# Patient Record
Sex: FEMALE | Race: BLACK OR AFRICAN AMERICAN
[De-identification: names, ages, dates, MRNs, and addresses within clinical notes are randomized per-mention and may not be internally consistent; named-entity substitution may affect disease eponyms.]

---

## 2020-10-05 NOTE — HP
Admitting History and Physical





- Primary Care Physician


PCP: Remington Wilson





- Admission


Chief Complaint: H/o right breast cancer


History of Present Illness: 


Patient is a 64 yo female with h/o right breast WE and SNBx in 2015 sec to 

cancer.  The cancer was triple negative and therefore patient received chemo and

radiation.  Patient is now presenting for removal of lifeport.


History Source: Patient


Limitations to Obtaining History: No Limitations





- Past Medical History


Heme/Onc: Yes: Cancer (right breast cancer (2015))





- Past Surgical History


Past Surgical History: Yes: Breast Biopsy (right breast WE with snbx 2015)


Additional Past Surgical History: 





port placement 


endometrial ablation (at 53)


left core bx (2017)





- Smoking History


Smoking history: Former smoker


Have you smoked in the past 12 months: No


If you are a former smoker, when did you quit?: 30 YEARS AGO





Home Medications





- Allergies


Allergies/Adverse Reactions: 


                                    Allergies











Allergy/AdvReac Type Severity Reaction Status Date / Time


 


vancomycin Allergy Severe Hives Verified 10/05/20 10:26


 


latex Allergy Intermediate Itching Verified 10/05/20 10:26














- Home Medications


Home Medications: 


Ambulatory Orders





NK [No Known Home Medication]  10/05/20 











Family Medical History


Family Hx Cancer: Grandmother (maternal) (ovarian cancer), Mother (breast cancer

at 75), Father (prostate cancer at 65)





Review of Systems





- Review of Systems


Constitutional: reports: No Symptoms


Cardiovascular: reports: No Symptoms


Respiratory: reports: No Symptoms





Physical Examination


Constitutional: Yes: Well Nourished, Calm


Breast(s): Yes: Other (A cup breasts with a well-healed right breast scar. No 

evidence of palpable masses or adenopathy noted bilaterally.)





Problem List





- Problems


(1) Breast cancer, right


Code(s): C50.911 - MALIGNANT NEOPLASM OF UNSP SITE OF RIGHT FEMALE BREAST   


Qualifiers: 


   Breast location: unspecified site of breast   Patient sex: female 





Assessment/Plan





Removal of lifeport

## 2020-10-08 ENCOUNTER — HOSPITAL ENCOUNTER (OUTPATIENT)
Dept: HOSPITAL 74 - FASU | Age: 65
Discharge: HOME | End: 2020-10-08
Attending: SURGERY
Payer: COMMERCIAL

## 2020-10-08 VITALS — HEART RATE: 77 BPM | DIASTOLIC BLOOD PRESSURE: 71 MMHG | SYSTOLIC BLOOD PRESSURE: 128 MMHG

## 2020-10-08 VITALS — BODY MASS INDEX: 23.7 KG/M2

## 2020-10-08 VITALS — TEMPERATURE: 98 F

## 2020-10-08 DIAGNOSIS — Z92.21: ICD-10-CM

## 2020-10-08 DIAGNOSIS — Z92.3: ICD-10-CM

## 2020-10-08 DIAGNOSIS — Z85.3: Primary | ICD-10-CM

## 2020-10-08 PROCEDURE — 05HN33Z INSERTION OF INFUSION DEVICE INTO LEFT INTERNAL JUGULAR VEIN, PERCUTANEOUS APPROACH: ICD-10-PCS | Performed by: SURGERY

## 2020-10-08 NOTE — OP
DATE OF OPERATION:  10/08/2020

 

PREOPERATIVE DIAGNOSIS:  History of right breast cancer.

 

POSTOPERATIVE DIAGNOSIS:  History of right breast cancer.

 

PROCEDURE:  Removal of left-sided internal jugular Port-A-Cath.

 

PRIMARY SURGEON:  Marco Wilson MD

 

FIRST ASSISTANT:  EDGAR Hanna

 

COMPLICATIONS:  There were no complications.

 

ANESTHESIA:  Local with IV sedation.

 

INDICATION:  Briefly the patient is a 65-year-old postmenopausal female of 

[place] descent who has a history of undergoing a right breast partial mastectomy in

2015 with sentinel lymph node biopsy for a 3-cm triple-negative breast cancer with

negative lymph nodes.  She underwent adjuvant chemotherapy through a left-sided

Port-A-Cath and had external beam radiation.  She has been doing fine and had some

recent biopsies showing benign papillomas in the left breast.  She comes in today for

removal of a left chest wall internal jugular vein Port-A-Cath which was placed back

in 2015.  The patient was brought in through Ambulatory Surgery on October 8, 2020. 

In the [facility] area site verification was made and informed consent was

obtained.

 

DESCRIPTION OF PROCEDURE:  She was brought into the operating room and laid on the OR

table in a supine position.  Venodynes were placed on the lower extremities prior to

local anesthesia.  She underwent IV sedation, and the left chest wall was sterilely

prepped and draped over the Port-A-Cath site.  Lidocaine 1% was given directly over

the Port-A-Cath site, and an elliptical incision was made around the previous

insertion site, and the scar was removed.  The catheter was completely removed intact

along with the capsule and sent to Pathology as specimen.  Hemostasis was achieved

using electrocautery.  The subcutaneous tissue was then reapproximated using 2-0

plain suture.  The skin was closed using interrupted 3-0 deep dermal Vicryl suture

and a running 4-0 subcuticular Biosyn suture.  Mastisol, Steri-Strips were applied

over the wound with a compressive dressing placed over this.  The patient tolerated

the procedure well without difficulty, was awake and alert at the end of the

procedure and will be brought back to Ambulatory Surgery, where she will be

discharged home the same day once discharge criteria are met.  Estimated blood loss

was minimal, and all sponge and needle counts were correct at the end of the case. 

The patient should follow up in the office in 1 week for a formal wound evaluation.

 

 

MARCO WILSON M.D.

 

COURTNEY/[id number]

DD: 10/08/2020 11:52

DT: 10/08/2020 12:26

Job #:  [id number]

## 2020-10-08 NOTE — XMS
Summarization Of Episode

                           Created on:2020



Patient:PRATIMA CAUSEY

Sex:Female

:1955

External Reference #:[id number]





Demographics







                          Address                   [address]



                                                    [place] [address]

 

                          Home Phone                [phone]

 

                          Work Phone                [phone]

 

                          Email Address             [email or web address]

 

                          Preferred Language        English

 

                          Marital Status            Not  or 

 

                          Sabianism Affiliation     BA

 

                          Race                      BL

 

                          Ethnic Group              Not  or 









Author







                          Organization              [facility]









Support







                Name            Relationship    Address         Phone

 

                [facility] Unavailable     [address]   [phone]



                                                [place] [address] 

 

                LARRY LANGLEY          [address]  [phone]



                                                [place] [address] 









Care Team Providers







                    Name                Role                Phone

 

                    JORGE LAKE     Unavailable         Unavailable

 

                    ACIERNO             Unavailable         Unavailable

 

                    Acierno             Unavailable         [phone]

 

                    MD [place]    Unavailable         Unavailable

 

                    MD [place]    Unavailable         Unavailable

 

                    MD [place]    Unavailable         Unavailable

 

                    MD [place]    Unavailable         Unavailable

 

                    MD [place]    Unavailable         Unavailable

 

                    MD [place]    Unavailable         Unavailable

 

                    MD [place]    Unavailable         Unavailable

 

                    MD [place]    Unavailable         Unavailable

 

                    MD [place]    Unavailable         Unavailable

 

                    MD [place]    Unavailable         Unavailable

 

                    MD [place]    Unavailable         Unavailable

 

                    MD [place]    Unavailable         Unavailable

 

                    MD [place]    Unavailable         Unavailable

 

                    MD [place]    Unavailable         Unavailable

 

                    MD [place]    Unavailable         Unavailable

 

                    MD [place]    Unavailable         Unavailable

 

                    MD [place]    Unavailable         Unavailable

 

                    MD [place]    Unavailable         Unavailable

 

                    MD [place]    Unavailable         Unavailable

 

                    MD [place]    Unavailable         Unavailable

 

                    MD [place]    Unavailable         Unavailable

 

                    MD [place]    Unavailable         Unavailable

 

                    MD [place]    Unavailable         Unavailable

 

                    MD [place]    Unavailable         Unavailable

 

                    MD [place]    Unavailable         Unavailable

 

                    ARNALDO Garcia          Unavailable         Unavailable

 

                    NATHANIEL Rubi   Unavailable         Unavailable

 

                    BIANCA LAKE          Unavailable         [phone]

 

                    BIANCA LAKE          Unavailable         [phone]

 

                    NATHANIEL LAKE.          Unavailable         [phone]

 

                    BIANCA LAKE          Unavailable         [phone]

 

                    BIANCA LAKE          Unavailable         [phone]

 

                    BIANCA LAKE          Unavailable         [phone]

 

                    LAKE,  M.          Unavailable         [phone]

 

                    NATHANIEL LAKE.          Unavailable         [phone]

 

                    NATHANIEL LAKE.          Unavailable         [phone]

 

                    NATHANIEL LAKE.          Unavailable         [phone]

 

                    NATHANIEL LAKE.          Unavailable         [phone]









Re-disclosure Warning

The records that you are about to access may contain information from federally-
assisted alcohol or drug abuse programs. If such information is present, then 
the following federally mandated warning applies: This information has been 
disclosed to you from records protected by federal confidentiality rules (42 CFR
part 2). The federal rules prohibit you from making any further disclosure of 
this information unless further disclosure is expressly permitted by the written
consent of the person to whom it pertains or as otherwise permitted by 42 CFR 
part 2. A general authorization for the release of medical or other information 
is NOT sufficient for this purpose. The Federal rules restrict any use of the 
information to criminally investigate or prosecute any alcohol or drug abuse 
patient.The records that you are about to access may contain highly sensitive 
health information, the redisclosure of which is protected by Article 27-F of 
the [facility] Public Health law. If you continue you may haveaccess to 
information: Regarding HIV / AIDS; Provided by facilities licensed or operated 
by the [facility] Office of Mental Health; or Provided by the [facility]
Office for People With Developmental Disabilities. If such information is 
present, then the following [place] mandated warning applies: This 
information has been disclosed to you from confidential records which are 
protected by state law. State law prohibits you from making any further 
disclosure of this information without the specific written consent of the 
person to whom it pertains, or as otherwise permitted by law. Any unauthorized 
further disclosure in violation of state law may result in a fine or MCC 
sentence or both. A general authorization for the release of medical or other 
information is NOT sufficient authorization for further disclosure.



Allergies and Adverse Reactions







           Type       Description Substance  Reaction   Status     Data Source(s

)

 

           1          LATEX      LATEX      (fatal)               [facility] ([facility]



                                                                  [facility] Group 

)

 

                      LATEX      Latex                            [facility] ([facility])

 

           1          vancomycin vancomycin                       [facility] ([facility]



                                                                  [facility])







Encounters







           Encounter  Providers  Location   Date       Indications Data Source(s

)

 

           Outpatient Attender: Yolie            10/05/2020            [facility] Rubi            07:34:00 AM            ([facility]



                                                                  [facility])

 

           Outpatient Attender: Yolie            10/04/2020            [facility] Rubi            09:04:00 AM            ([facility]



                                            EDT                   [facility])

 

           Outpatient Attender: Yolie            2020            [facility]ISSA Carolina                   03:00:00 PM            ([facility]



                      DishaReferrer:            EDT                   [facility]

l - [facility])

 

           Outpatient Attender: Yolie            2020            [facility] Rubi            09:49:00 AM            ([facility]



                                            EDT                   [facility])

 

           Outpatient Attender: Yolie            2020            [facility] Rubi            12:00:00 AM            ([facility]



                                            EDT                   [facility])

 

                      Attender: JORGE            2020            Vel [place]                 02:47:19 PM            



                                            EDT -                 



                                            2020            



                                            11:59:00 PM            



                                            EDT                   

 

                      Attender: JORGE            2020            [facility] leslie



                      [place]                 02:47:19 PM            



                                            EDT -                 



                                            2020            



                                            11:32:08 AM            



                                            EDT                   

 

                      Attender: JORGE PRINCE SDS-POB MRI 2020            [facility]



                      [place]                 11:33:30 AM            



                                            EDT -                 



                                            2020            



                                            11:59:00 PM            



                                            EDT                   

 

                      Attender: JORGE NOYOLAB SDS-POB 2020            [facility]



                      [place]      MAMMO      11:32:08 AM            



                                            EDT -                 



                                            2020            



                                            11:32:08 AM            



                                            EDT                   

 

                      Attender: Dank            2020            [facility]



                      Acierno               11:07:10 AM            



                                            EDT -                 



                                            2020            



                                            11:08:19 AM            



                                            EDT                   

 

                      Attender: DANK PRINCE UC-POB UC 2020            [facility]



                      ACIERNO               10:53:19 AM            



                                            EDT                   

 

                      Attender: JORGE            07/10/2020            [facility] leslie



                      [place]                 11:54:11 AM            



                                            EDT -                 



                                            07/10/2020            



                                            11:59:00 PM            



                                            EDT                   

 

                      Attender: JORGE            07/10/2020            [facility] YEAGEREL                 11:08:19 AM            



                                            EDT -                 



                                            07/10/2020            



                                            11:59:00 PM            



                                            EDT                   

 

                      Attender: JORGE            07/10/2020            [place] [facility]

[facility]



                      [place]                 11:08:14 AM            



                                            EDT -                 



                                            07/10/2020            



                                            08:43:00 AM            



                                            EDT                   

 

                      Attender: UMANGI POB SDS-POB BC 07/10/2020            [facility]



                      [place]      US         08:44:59 AM            



                                            EDT                   

 

                      Attender: UMANGI POB SDS-POB BC 07/10/2020            [facility]

[facility]



                      [place]      US         08:44:09 AM            



                                            EDT -                 



                                            07/10/2020            



                                            11:59:00 PM            



                                            EDT                   

 

                      Attender: UMANGI POB SDS-POB 07/10/2020            [facility]



                      [place]      MAMMO      08:42:18 AM            



                                            EDT -                 



                                            07/10/2020            



                                            08:43:00 AM            



                                            EDT                   

 

                      Attender: UMANGI            2020            [place] [facility]

[facility]



                      [place]                 11:14:47 AM            



                                            EDT -                 



                                            2020            



                                            11:59:00 PM            



                                            EDT                   

 

                      Attender: JORGE POB SDS-POB MRI 2020            [facility]

[facility]



                      [place]                 10:57:10 AM            



                                            EDT -                 



                                            2020            



                                            11:59:00 PM            



                                            EDT                   

 

           Outpatient Attender: Yolie            06/10/2020            MARISSA Rubi            10:47:00 AM            ([facility]



                                            EDT                   [facility])

 

           Outpatient Attender: Yolie            06/10/2020            MAR[facility] Rubi            10:37:00 AM            ([facility]



                                            EDT                   [facility])

 

           Outpatient                       06/10/2020            [facility] ([facility]

[facility]



                                            09:52:00 AM            [facility]

e)



                                            EDT                   

 

           Outpatient                       2020            [facility] ([facility]

al



                                            08:42:00 AM            [facility]

e)



                                            EDT                   

 

           Outpatient Attender: Yolie            2020            MARISSA Rubi            03:21:00 PM            ([facility]



                                            EDT                   [facility])

 

           Outpatient Attender: Yolie            2020            MARISSA Carolina                   03:15:00 PM            ([facility]



                      DishaReferrer:            EDT                   [facility]

l - [facility])

 

           Outpatient                       2020            [facility] ([facility]

al



                                            07:11:00 AM            [facility]

e)



                                            EDT                   

 

           Outpatient                       2020            MARISSA ([facility]

al



                                            07:47:00 AM            [facility]

e)



                                            EDT                   

 

           [facility]                       [facility]

[facility]



                                            06:54:00 AM            [facility]

e)



                                            EST                   

 

           Outpatient                       2020            MARISSA ([facility]

al



                                            07:22:00 AM            [facility]

e)



                                            EST                   

 

           Outpatient Attender: Yolie            2019            MARISSA Carolina                   10:00:00 AM            [facility]                   [facility]

[facility])

 

           Outpatient Attender: JANI            2019            MARISSA MAURO MD            10:18:00 AM            ([facility]

t



                                            EST                   [facility])

 

                      Attender: UMANGI            10/28/2019            [place] [facility] leslie



                      [place]                 09:44:57 AM            



                                            EDT -                 



                                            10/28/2019            



                                            11:59:00 PM            



                                            EDT                   

 

                      Attender: UMANGI POB SDS-POB BD 10/28/2019            [facility]

[facility]



                      [place]                 09:06:05 AM            



                                            EDT                   

 

                      Attender: UMANGI            10/28/2019            [place] [facility]

[facility]



                      [place]                 08:22:09 AM            



                                            EDT -                 



                                            10/28/2019            



                                            08:44:00 AM            



                                            EDT                   

 

                      Attender: UMANGI POB SDS-POB MRI 10/28/2019            [facility]

[facility]



                      [place]                 08:01:22 AM            



                                            EDT                   

 

           Outpatient Attender: Yolie            10/16/2019            MARISSA Rubi            12:00:00 AM            ([facility]



                                            EDT                   [facility])

 

           Outpatient Attender: Yolie            2019            MARISSA Carolina                   12:00:00 AM            ([facility]



                      DishaReferrer:            EDT                   [facility]

l - [facility])







Medications







       Medication Brand  Start  Product Dose   Route  Administrative Pharmacy 

at 

Indications         Reaction            Description         Data



          Name Date Form           Instructions Instructions                    

 Source(s)

 

     Carboxymeth REFRES                               RP                  [facility]

N



     ylcellulose H                                                      ([facility]

nt



     Sodium 5 TEARS                                                   Medical -



     MG/ML                                                        [facility]



     Ophthalmic                                                        Medical



     Solution                                                        Group )



     0.5 % 0.5 %                                                        









                                        This may be an active medication. No end

 date is available. Start date above may

not



                                        reflect actual date the medication was s

tarted.









     lidocaine 2351-4248-32 2020      10   Infiltration           complete

d           10 mL, [place]



     1 %       02:45:00 PM      mL                                 Infiltration,

 Health



     injection      EDT                                          ONCE, Thu 



     10 mL                                                   20 at 



                                                            1445, For 1 



                                                            dose 









                                        Medication administered onsite









        Epinephrine 0.01 MG/ML lidocaine-epinephrine 2020         30      

Intradermal                 

completed                                       30 mL,          [place]



     / Lidocaine 1 %-1:875131 injection 02:30:00 PM      mL                     

            Intradermal, Health



     Hydrochloride 10 MG/ML 30 mL EDT                                          O

NCE, Thu 



     Injectable Solution                                                    at 



     lidocaine-epinephrine                                                   144

5, For 1 



     1 %-1:618185 injection                                                   do

se 



     30 mL                                                        









                                        Medication administered onsite









     gadoteridol 86841 2020      0.1  Intravenous           completed     

      7.03 mmol [place]



     (PROHANCE)      01:15:00 PM      mmol/kg                               (0.1

 mmol/kg Health



      injection       EDT                                                   

                                       



      7.03 mmol                                                             

                                        



                                                            70.3 kg), 



                                                            Intravenous, 



                                                            ONCE, Thu 



                                                            20 at 



                                                            1315, For 1 



                                                            dose 









                                        Medication administered onsite









     gadoteridol 16685 2020      0.1  Intravenous           completed     

      7.08 mmol [place]



     (PROHANCE)      12:00:00 PM      mmol/kg                               (0.1

 mmol/kg Health



      injection       EDT                                                   

                                       



      7.08 mmol                                                             

                                        



                                                            70.8 kg), 



                                                            Intravenous, 



                                                            ONCE, Fri 



                                                            20 at 



                                                            1200, For 1 



                                                            dose 









                                        Medication administered onsite









     Doxycycline DOXYCYCLINE 2019                take 1      RP           

       [facility]



     Monohydrate 100 MONOHYDRATE 12:00:00 AM                tablet by           

               ([facility]



     MG Oral Tablet      EST                 oral route                         

 [facility] - [facility]



     100 mg 100 mg                          2 times                          [facility]



                                   every day                          Group [facility])









                                        This may be an active medication. No end

 date is available.









     gadoteridol 92735 10/28/2019      0.1  Intravenous           completed     

      6.99 mmol [place]



     (PROHANCE)      08:45:00 AM      mmol/kg                               (0.1

 mmol/kg Health



      injection       EDT                                                   

                                       



      6.99 mmol                                                             

                                        



                                                            69.9 kg), 



                                                            Intravenous, 



                                                            ONCE, Mon 



                                                            10/28/19 at 



                                                            0845, For 1 



                                                            dose 









                                        Medication administered onsite









     adapalene 1 ADAPALENE 2017                apply by      RP           

       NEXT[facility]



     MG/ML Topical      12:00:00 AM EDT                topical route            

              ([facility]



     Cream 0.1 % 0.1                           every day to                     

     [facility]



     %                             the affected                          [facility]



                                   area(s) at                          [facility])



                                   bedtime                          









                                        This may be an active medication. No end

 date is available.









     Fluocinonide 0.5 FLUOCINONIDE 2017                apply by         

               NEXT[facility]



     MG/ML Topical      12:00:00 AM                topical                      

    ([facility]



     Solution 0.05 %      EDT                 route 2                          M

edical - [facility]



     0.05 %                          times every                          [facility] 

Medical



                                   day to the                          [facility])



                                   affected                          



                                   area(s)                          









                                        This may be an active medication. No end

 date is available.









     Fluocinonide FLUOCINONIDE 2017                apply by      FORTUNATO       

           NEXT[facility]



     0.0005 MG/MG      12:00:00 AM                topical                       

   ([facility]



     Topical Ointment      EDT                 route 2                          

[facility]



     0.05 % 0.05 %                          times every                         

 [facility]



                                   day to the                          [facility])



                                   affected                          



                                   area(s)                          









                                        This may be an active medication. No end

 date is available.







Insurance Providers







          Payer name Policy type Policy ID Covered   Covered party's Policy    P

jayme



                    / Coverage           party ID  relationship to Dale    Inf

ormation



                    type                          dale              

 

          BC PPO              [id number]           [id number]                  [id number]

[id number]

 

          [place]           [id number]           [id number]                   [id number]

[id number]



          [place]                                                   

 

          BLUE CROSS OF           [id number]           Self                [id number]

[id number]



          [place]                                                          

 

          [employer]              [id number]           Self                [id number]



          HEALTHCARE                                                   







Problems, Conditions, and Diagnoses







           Code       Display Name Description Problem Type Effective  Data Sour

ce(s)



                                                       Dates      

 

           Z95.828    Presence of other Port-A-Cath in Diagnosis  2020 NEX

TGEN



                      vascular implants place                 03:00:00 PM ([facility]

[facility]



                      and grafts                       EDT        [facility] - [facility]



                                                                  [facility])

 

           Z01.818    Encounter for Pre-op evaluation Diagnosis  2020 NEXT

GEN



                      other                            03:00:00 PM ([facility]



                      preprocedural                       EDT        Medical - [facility]

t



                      examination                                  [facility])

 

           N63.0      Unspecified lump Unspecified lump Diagnosis  2020 [facility]

[facility]



                      in unspecified in unspecified            11:32:08 AM 



                      breast     breast                EDT        

 

           Z11.59     Encounter for Encounter for Diagnosis  2020 Vel jones



                      screening for screening for            10:53:19 AM 



                      other viral other viral            EDT        



                      diseases   diseases                         

 

           R92.8      Other abnormal and Other abnormal Diagnosis  07/10/2020 [facility]

[facility]



                      inconclusive and inconclusive            08:44:59 AM 



                      findings on findings on            EDT        



                      diagnostic imaging diagnostic                       



                      of breast  imaging of breast                       

 

           C50.919    Malignant neoplasm Malignant  Diagnosis  07/10/2020 [facility]



                      of unspecified neoplasm of            08:42:18 AM 



                      site of    unspecified site            EDT        



                      unspecified female of unspecified                       



                      breast     female breast                       

 

           Z11.59     Encounter for Encounter for Diagnosis  2020 NEXTGEN



                      screening for laboratory            03:15:00 PM ([facility]



                      other viral testing for            EDT        [facility]



                      diseases   COVID-19 virus                       [facility]

[facility]



                                                                  [facility])

 

           M54.2      Cervicalgia Neck pain  Diagnosis  2019 NEXTGEN



                                                       10:00:00 AM ([facility]



                                                       EST        [facility]



                                                                  [facility]



                                                                  [facility])

 

           W57.xxxA   Bitten or stung by Insect bite, Diagnosis  2019 NEXT

GEN



                      nonvenomous insect unspecified site,            10:00:00 A

M ([facility]



                      and other  initial encounter            EST        [facility]

 - [facility]



                      nonvenomous                                  [facility]



                      arthropods,                                  [facility])



                      initial encounter                                  







Surgeries/Procedures







             Procedure    Description  Date         Indications  Data Source(s)

 

             OFFICE CONSULTATION OFFICE CONSULTATION 2020                N

EXT[facility] ([facility]



                                       12:00:00 AM               [facility] - [facility] Ki

sco



                                       EDT                       [facility]

C)









        MA DIG  MA DIG  Routine 2020 Lump or         2020 Lump or [facility]

[facility]



        POST PROC POST PROC         2:27 PM EDT mass in         02:27:50 PM mass

 in Health



        KHADAR LEFT KHADAR LEFT                 breast          EDT     breast  



        BREAST  BREAST                                                  









                                        Lump or mass in breast









        MR GUIDANCE MRI     Routine 2020 Lump or         2020 Lump o

r [place]



        NEEDLE  BREAST          2:13 PM EDT mass in         02:13:25 PM mass in 

Health



        PLACEMENT BIOPSY                  breast          EDT     breast  



                LEFT                                                    









                                        Lump or mass in breast









        CREATININE CREATININE Routine 2020     

    [place]



        BLOOD   BLOOD POC         12:16 PM                 12:16:00 PM         H

ealth



                                EDT                     EDT             

 

        US AXILLA US AXILLA Routine 07/10/2020 Abnormal         07/10/2020 [facility]

mal MRI, [place]



        LEFT    LEFT            9:10 AM EDT ultrasound         09:10:37 AM breas

tAbnormal Health



                                                                 of breast



                                EDT             ultrasound of   



                                        Abnormal                 breast  



                                        MRI, breast                         









                                        Abnormal MRI, breast

 

                                        Abnormal ultrasound of breast









        US EVAL US EVAL Routine 07/10/2020 Breast          07/10/2020 Breast  [facility]

[facility]



        BREAST  BREAST          9:10 AM EDT cancer          09:10:17 AM cancer  

Health



        MASS LEFT MASS LEFT                                 EDT             









                                        Breast cancer









        MA DIGITAL MA DIGITAL Routine 07/10/2020 Breast          07/10/2020 [facility]

st  [place]



        MAMMO CHANTELLE MAMMO CHANTELLE         8:48 AM EDT cancer          08:48:04 AM can

er  Health



        DIAGNOSTIC DIAGNOSTIC                                 EDT             









                                        Breast cancer









        MRI BREAST MRI BREAST Routine 2020 Malignant         2020 Ma

lignant [place]



        CHANTELLE WO AND CHANTELLE WO AND         12:47 PM EDT neoplasm of         12:47:06 

PM neoplasm of Health



        W CONTRAST W CONTRAST                 breast          EDT     breast  



        AND CAD AND CAD                 (female)                 (female) 









                                        Malignant neoplasm of breast (female)









             OFFICE/OUTPATIENT VISIT OFFICE/OUTPATIENT VISIT 2020         

       [facility]



             EST          EST          12:00:00 AM EDT              ([facility]



                                                                 [facility] - [facility]



                                                                 [facility]



                                                                 [facility] )









        MRI BREAST MRI BREAST Routine 10/28/2019 Malignant         10/28/2019 Ma

lignant [place]



        CHANTELLE WO AND CHANTELLE WO AND         9:14 AM EDT neoplasm of         01:14:38 P

M neoplasm of Health



        W CONTRAST W CONTRAST                 breast          EDT     breast  



        AND CAD AND CAD                 (female)                 (female) 









                                        Malignant neoplasm of breast (female)









        DXA BONE BD BONE Routine 10/28/2019 Malignant         10/28/2019 Maligna

nt [place]



        DENSITY MINERAL         9:11 AM EDT neoplasm of         01:11:23 PM neop

las of Health



        STUDY 1/> DENSITY                 breast          EDT     breast  



        SITES   DEXASCAN                 (female)                 (female) 



        AXIAL SKEL                                                         









                                        Malignant neoplasm of breast (female)







Results







                    ID                  Date                Data Source

 

                    [id number]            2020 04:25:01 PM EDT [facility]









                                        ADDENDUM:   4:25 PM by Rory Santana MD---------- 

ADDENDED



                                        REPORT ---------- 2020 Addendum: I

 have reviewed the pathologist's 

findings.



                                        Pathology diagnosis:Site A: Papillary ne

oplasm.  These results indicate a 

high-risk



                                        lesion and are concordant with theimagin

g finding. Site B: Sclerosing Papillary



                                        Neoplasm.  These results indicate a high

-risk lesionand are concordant with the



                                        imaging finding. In view of these findin

gs a surgical consultation 

isrecommended. The



                                        written copy of the final pathology repo

rt has been forwarded to your office by 

the



                                        departmentof pathology. Thank you for th

e opportunity to participate in the care

of



                                        this patient. ORIGINAL: Thu 2020

  2:47 PM by Gallito Amaya MDHISTORY:Patient is seen for a magnetic

 resonance imaging needle biopsy with 

vacuum



                                        assistance of asuspicious area of non ma

ss-like enhancement in the left breast 

in the



                                        lower quadrant,  of area ofnon mass-like

 enhancement in the left breast in the 

upper



                                        quadrant. CONSENT:Prior to the exam, the

 procedure was discussed and questions



                                        answered with the patient. Thedeployment

 of the marker clip was also discussed 

witht



                                        the patient. Oral and written informedco

nsent was obtained. Standard MRI patient



                                        screening was observed. A universal prot

ocol time outwas performed. 

TECHNIQUE:The



                                        patient was positioned prone on the MR t

able with her breast within a dedicated



                                        breast coil. Aninitial T1 weighted gradi

ent echo pre-contrast sequence utilizing

fat



                                        suppression was obtained inthe sagittal 

projection. The patient was then 

injected



                                        with  Omni scan contrast. Additional T1w

eighted post-contrast sagittal T1 

gradient



                                        echo sequences were obtained at 1 min an

d 2 minpost-injection. The lesion to be



                                        biopsied was targeted. The skin was prep

ped with Betadine. 1%lidocaine was used 

for



                                        superficial anesthesia. 1% lidocaine wit

h epinephrine was used for 

deepanesthesia



                                        surrounding the lesion(s). A 9 gauge Thee

os probe was inserted into the tissue to



                                        bebiopsied and samples were obtained usi

ng vacuum assistance.  Post procedure



                                        sequences were obtainedto ensure proper 

placement of the biopsy needle. The 

patient



                                        tolerated the procedure well withoutcomp

lications. Post discharge instructions 

were



                                        given to the patient orally and a writte

n copy wassupplied.  FINDINGS SITE



                                        1:Following targeting a MR compatible bi

opsy needle was inserted and advanced to

the



                                        level of thelesion. Several samples were

 taken. A barbell clip was deployed 

through



                                        the probe at the biopsysite for future l

ocalization purposes. FINDINGS SITE 

2:Using



                                        the technique described in finding site 

1. A D-Shaped clip was deployed through 

the



                                        probe atthe biopsy site for future local

ization purposes.  POST LOCALIZATION



                                        MAMMOGRAM:A post-procedure mammogram was

 performed and documents satisfactory 

clip



                                        placement.  IMPRESSION:Successful MRI gu

ided needle biopsy of a suspicious area 

of



                                        non mass-like enhancement in the leftbre

ast and of area of non mass-like 

enhancement



                                        in the left breast.  Histology/ Cytology

 pending.











          Name      Value     Range     Interpretation Code Description Data Radha

rce(s) Supporting



                                                                      Document(s

)

 

                                                                      











                    ID                  Date                Data Source

 

                    [id number]            2020 04:24:58 PM EDT [facility]









                                        ADDENDUM:   4:24 PM by Rory Santana MD---------- 

ADDENDED



                                        REPORT ---------- 2020 Addendum: I

 have reviewed the pathologist's 

findings.



                                        Pathology diagnosis:Site A: Papillary ne

oplasm.  These results indicate a 

high-risk



                                        lesion and are concordant with theimagin

g finding. Site B: Sclerosing Papillary



                                        Neoplasm.  These results indicate a high

-risk lesionand are concordant with the



                                        imaging finding. In view of these findin

gs a surgical consultation 

isrecommended. The



                                        written copy of the final pathology repo

rt has been forwarded to your office by 

the



                                        departmentof pathology. Thank you for th

e opportunity to participate in the care

of



                                        this patient. ORIGINAL: Thu 2020

  2:47 PM by Gallito Amaya MDHISTORY:Patient is seen for a magnetic

 resonance imaging needle biopsy with 

vacuum



                                        assistance of asuspicious area of non ma

ss-like enhancement in the left breast 

in the



                                        lower quadrant,  of area ofnon mass-like

 enhancement in the left breast in the 

upper



                                        quadrant. CONSENT:Prior to the exam, the

 procedure was discussed and questions



                                        answered with the patient. Thedeployment

 of the marker clip was also discussed 

witht



                                        the patient. Oral and written informedco

nsent was obtained. Standard MRI patient



                                        screening was observed. A universal prot

ocol time outwas performed. 

TECHNIQUE:The



                                        patient was positioned prone on the MR t

able with her breast within a dedicated



                                        breast coil. Aninitial T1 weighted gradi

ent echo pre-contrast sequence utilizing

fat



                                        suppression was obtained inthe sagittal 

projection. The patient was then 

injected



                                        with  Omni scan contrast. Additional T1w

eighted post-contrast sagittal T1 

gradient



                                        echo sequences were obtained at 1 min an

d 2 minpost-injection. The lesion to be



                                        biopsied was targeted. The skin was prep

ped with Betadine. 1%lidocaine was used 

for



                                        superficial anesthesia. 1% lidocaine wit

h epinephrine was used for 

deepanesthesia



                                        surrounding the lesion(s). A 9 gauge Thee

os probe was inserted into the tissue to



                                        bebiopsied and samples were obtained usi

ng vacuum assistance.  Post procedure



                                        sequences were obtainedto ensure proper 

placement of the biopsy needle. The 

patient



                                        tolerated the procedure well withoutcomp

lications. Post discharge instructions 

were



                                        given to the patient orally and a writte

n copy wassupplied.  FINDINGS SITE



                                        1:Following targeting a MR compatible bi

opsy needle was inserted and advanced to

the



                                        level of thelesion. Several samples were

 taken. A barbell clip was deployed 

through



                                        the probe at the biopsysite for future l

ocalization purposes. FINDINGS SITE 

2:Using



                                        the technique described in finding site 

1. A D-Shaped clip was deployed through 

the



                                        probe atthe biopsy site for future local

ization purposes.  POST LOCALIZATION



                                        MAMMOGRAM:A post-procedure mammogram was

 performed and documents satisfactory 

clip



                                        placement.  IMPRESSION:Successful MRI gu

ided needle biopsy of a suspicious area 

of



                                        non mass-like enhancement in the leftbre

ast and of area of non mass-like 

enhancement



                                        in the left breast.  Histology/ Cytology

 pending.











          Name      Value     Range     Interpretation Code Description Data Radha

rce(s) Supporting



                                                                      Document(s

)

 

                                                                      











                    ID                  Date                Data Source

 

                    [id number]           2020 02:56:41 PM EDT [facility]











          Name      Value     Range     Interpretation Description Data      Sup

porting



                                        Code                Source(s) Document(s

)

 

          Transcription                                         [facility]                                         Health    



          Interface Message                                                   



          Text                                                        









                                        Pt. Tolerated procedure well.  No bleedi

ng noted at biopsy site.  Steri 

stripsdry,



                                        clean and intact.  Ice pack fitted in br

a with binder in place.  Pt. Deniespain 

or



                                        discomfort.  Discharge instructions give

n to patient and patientverbalized



                                        understanding instructions.











                    ID                  Date                Data Source

 

                    [id number]            2020 11:02:00 AM EDT [facility]









                                        Surgical Pathology Report               

          Case: [id number]



                                                    Authorizing Provider:  Gallito Amaya MD       Collected:



                                        2020 1400            Ordering Loca

tion:     [facility]



                                        Received:            2020 0812    

                               Center



                                        Outpatient Building



                                             [facility]



                                        Pathologist:           Chuy Tanner MD



                                                    Specimens:   A) - Breast, Le

ft, MRI:  DX Enhancing Left Breast foci



                                        Specimen A:  Left Breast                

  Inferior



                                                                                

              B) - Breast, Left, MRI:



                                        Specimen B:  Left Breast Superior       

                    A. Left breast, 

inferior,



                                        MRI guided biopsy: Papillary neoplasm.Ad

jacent fibrocystic and fibroadenomatoid



                                        changes.B. Left breast, superior, MRI gu

ided biopsy:Sclerosing papillary



                                        neoplasm.Adjacent proliferative fibrocys

tic and fibroadenomatoid changes.See



                                        comment.Electronically signed by ANASTASIYA Tanner MD on 2020 at 11:02



                                        AMMyoepithelial markers CK5/6, smooth mu

scle myosin heavy chain and p40 

demonstrate



                                        an intact myoepithelial cell layer mitig

ating against invasive malignancy.A.



                                        Received in formalin labeled left breast

 inferior MRI.  It consists of multiple



                                        irregular and cylindrical fragments of s

oft, yellow-white tissue measuring in



                                        aggregate 2.5 x 1.3 x 0.8 cm.  The speci

men is entirely submitted in four 

blocks. B.



                                        Received in formalin labeled left breast

 superior MRI.  It consists of multiple



                                        irregular and cylindrical fragments of s

oft, yellow-white tissue measuring in



                                        aggregate. 3.0 x 1.5 x 0.6 cm.  The spec

imen is entirely submitted in four 

blocks.











          Name      Value     Range     Interpretation Code Description Data Radha

rce(s) Supporting



                                                                      Document(s

)

 

                                                                      











                    ID                  Date                Data Source

 

                    [id number]            2020 12:18:00 PM EDT [facility]











          Name      Value     Range     Interpretation Description Data      Sup

porting



                                        Code                Source(s) Document(s

)

 

          CREATININE 0.8       0.6-1.3                       [place]    



          BLOOD POC mg/dL                                   Health    

 

          EGFR RUDY            >=60.0                        [place]    



          FEMALE POC                                         Health    

 

          EGFR AA FEMALE           >=60.0                        [place]    



          POC                                               Health    











                    ID                  Date                Data Source

 

                    [id number]           2020 05:07:20 PM EDT [place] [facility]











          Name      Value     Range     Interpretation Description Data      Sup

porting



                                        Code                Source(s) Document(s

)

 

          Transcription                                         [place]    



          [employer]                                         [facility]    



          Interface Message                                                   



          Text                                                        









                                        Pre-surgical COVID-19 Screening Encounte

r NoteHPI:Patient presents for COVID-19



                                        testing ahead of biopsy scheduled for with [facility]. Patient 

denies



                                        any sick contacts in the last 14 days. T

hepatient denies travel history outside 

the



                                        [place] in the last 14 days. Denies cough,fev

er, chills, rash, SOB or chest pain. 

Patient



                                        had pre-surgical testing visiton 20

20.Review of Systems:Constitutional: 

Negative



                                        for chills, fevers, malaise/fatigue and 

weight lossHEENT: Negative congestion, 

sinus



                                        pain or pressure or sore throatRespirato

ry: Negative for cough, sputum 

production,



                                        SOB or hemoptysisCardiovascular: Negativ

e for chest pain, palpitations or leg



                                        swellingMusuloskeletal: Negative for myl

agias or joint painsPhysical 

Exam:Vitals:



                                        Temperature: 96.7Constitutional: Patient

 is well-developed, well-nourished and 

in no



                                        distressHEENT: Nose Normal. Oropharynx i

s cleared and moist. Conjunctivae are



                                        normalbilaterally. Right and left eye ex

hibit no discharge.Skin: Skin is warm 

and



                                        dry. No rash or erythema noted.Past Medi

opal History:Past Medical 

History:Diagnosis



                                        Date





                                         High cholesterol





                                         Neuropathy





                                         Pain of right breast





                                         Right breast cancer with malignant cell

s in regional lymph nodes no greaterthan

0.2



                                        mm and no more than 200 cellsPast Surgic

al History:Past Surgical 

History:Procedure



                                        Laterality Date





                                         bilateral eye surgery  2014 corne

al abrasion





                                         BREAST SURGERY right lumpectomy





                                         endometrial ablasion





                                         excessive breast tissue removed   





                                         EYE SURGERY Bilateral chronic dry eye





                                         right lumpectomy   12/10/2014Past Famil

y History:Family HistoryProblem Relation

Age



                                        of Onset





                                         Breast cancer Mother





                                         Prostate cancer Father





                                         Ovarian cancer Maternal Grandmother





                                         Breast cancer CousinPast Social History

:Social HistorySocioeconomic History





                                         Marital status:   Spouse name: N

ot on file





                                         Number of children: Not on file





                                         Years of education: Not on file





                                         Highest education level: Not on fileOcc

upational History





                                         Not on fileSocial Needs





                                         Financial resource strain: Not on file





                                         Food insecurity  Worry: Not on file  In

ability: Not on file





                                         Transportation needs  Medical: Not on f

ile  Non-medical: Not on fileTobacco 

Use





                                         Smoking status: Former Smoker  Last att

empt to quit: 1987  Years since



                                        quittin.4





                                         Smokeless tobacco: Never UsedSubstance 

and Sexual Activity





                                         Alcohol use: Yes  Comment: social





                                         Drug use: No





                                         Sexual activity: Not on fileLifestyle





                                         Physical activity  Days per week: Not o

n file  Minutes per session: Not on 

file





                                         Stress: Not on fileRelationships





                                         Social connections  Talks on phone: Not

 on file  Gets together: Not on file  

Attends



                                        Mosque service: Not on file  Active m

ember of club or organization: Not on 

file



                                        Attends meetings of clubs or organizatio

ns: Not on file  Relationship status: 

Not on



                                        file





                                         Intimate partner violence  Fear of curr

ent or ex partner: Not on file  

Emotionally



                                        abused: Not on file  Physically abused: 

Not on file  Forced sexual activity: Not

on



                                        fileOther Topics Concern





                                         Not on fileSocial History Narrative





                                         Not on fileAllergies: Other; Vancomycin

; Erythromycin; and 

LatexMedications:Current



                                        Outpatient MedicationsMedication Sig Dis

pense Refill





                                         Ascorbic Acid (VITAMIN C) 100 MG tablet

 Take 100 mg by mouth daily.





                                         b complex vitamins capsule Take 1 capsu

le by mouth daily.





                                         Coenzyme Q10 (COQ-10 PO) Take  by mouth

.





                                         Lactobacillus (ACIDOPHILUS PO) Take  by

 mouth.





                                         polyvinyl alcohol (ARTIFICIAL, LIQUIFIL

M TEARS) 1.4 % ophthalmic solution 1drop

as



                                        needed.





                                         sodium chloride (JEFF 128) 5 % ophthalm

ic solution Place 1 drop into both 

eyesas



                                        needed.No current facility-administered 

medications for this visit.Labs (In the 

last



                                        30 days):No visits with results within 1

 Month(s) from this visit.Latest known 

visit



                                        with results is:Office Visit on 20

17Component Date Value Ref Range Status





                                         Glucose 2017 91  65 - 99 mg/dL Fi

nal





                                         BUN 2017 14  8 - 27 mg/dL Final





                                         CREATININE 2017 0.65  0.57 - 1.00

 mg/dL Final





                                         EGFR NON AFR AMERICAN 2017 96  >5

9 mL/min/1.73 Final





                                         EGFR AFRICANAMERICAN 2017 111  >5

9 mL/min/1.73 Final





                                         BUN/Creatinine Ratio 2017 22  12 

- 28 Final





                                         SODIUM 2017 143  134 - 144 mmol/L

 Final





                                         Potassium 2017 4.0  3.5 - 5.2 mmo

l/L Final





                                         Chloride 2017 101  96 - 106 mmol/

L Final





                                         Carbon Dioxide 2017 26  18 - 29 m

mol/L Final





                                         Calcium 2017 10.1  8.7 - 10.3 mg/

dL FinalAssessment and Plan:Pratima was 

seen



                                        today for coronavirus screening.Diagnose

s and all orders for this 

visit:Encounter for



                                        screening for other viral diseases-     

COVID-19 ([facility]line)COVID -19 

Nasopharyngeal



                                        swab was obtained without difficulty.The

 Patient wore a surgical mask during the



                                        encounter except when sample wasbeing ob

tained.The following PPE was worn by the



                                        provider during the visit: N-95, Face sh

ield/Protective Eyewear, Gown and



                                        Gloves.Patient advised to follow up with

 surgeon or PCP's office is they



                                        developsymptoms. Patient acknowledges ve

rbal understanding of treatment plan and

has



                                        nofurther questions at this time.The pat

ient shows no signs or symptoms 

indicating



                                        COVID-19 infection. Thesurgical team and

 patient will be notified of the COVID 

swab



                                        result. The patientwill be considered op

timized for surgery following a negative

test



                                        result.Signed:Dank Leavitt, TEREZA2020











                    ID                  Date                Data Source

 

                    [id number]            2020 05:58:00 PM EDT [facility]











          Name      Value     Range     Interpretation Description Data      Sup

porting



                                        Code                Source(s) Document(s

)

 

          SARS-COV  Not       Not                           [place]    



          -2        Detected  Detected                      Health    









                                        2020 @ 5:58pm results received from

 [facility] via fax.











                    ID                  Date                Data Source

 

                    [id number]            07/10/2020 11:54:05 AM EDT [facility]









                                        ORIGINAL: Fri Jul 10, 2020 11:54 AM by Rory Santana MDHISTORY:Patient is

64



                                        years old and is seen for diagnostic and

 personalhistory of breast cancer in the



                                        right breast. The patient's lastclinical

 breast examwas on 2020. The 

patient has



                                        a history of bilateral MRI Biopsy [facility]

anastasiia, , left needle biopsy in  -



                                        benign, right lumpectomy [id number] - malign

ant and bilateral excisional biopsy more

than



                                        10 yearsago - benign.  The patient has a

 history of right breast cancer [id number]. 

The



                                        patient has the following family history

 of breast cancer:mother, at age73, 

breast



                                        cancer. FILMS COMPARED:The present exami

nation has been compared to prior 

imaging



                                        [facility]per[facility] and [facility], [facility]



                                        [employer] on 20

19, 2019, 10/28/2019and 

2020.



                                        MAMMOGRAM FINDINGS:The following mammogr

aphic views were obtained: bilateral cc



                                        spotcompression with tomosynthesis, bila

teral spot lateral withtomosynthesis,



                                        bilateral craniocaudal with tomosynthesi

s, bilateralmediolateral obliquewith



                                        tomosynthesis, bilateral 90 degree later

al with tomosynthesis. Computer-aided



                                        detection wasutilized by the radiologist

 in the interpretation of this 

examination.



                                        The breasts are heterogeneously dense, w

hich may obscure small masses. Finding 

1:



                                        There are multiple biopsy clips in both 

breasts. Finding 2:  There are stable



                                        punctate calcifications with regionaldis

tribution in the lateral region of the 

left



                                        breast. ULTRASOUND FINDINGS:High resolut

ion real time scanning was performed of 

all



                                        four quadrantsandthe retroareolar region

s of both breasts. Finding 3:  There is 

a



                                        cyst measuring 5 millimeters in the left

 breastat 5 o'clock located 2 

centimeters



                                        from the nipple.  Internalechotexture is

 anechoic. There is a single punctate



                                        calcification Finding 4:  The finding in

 question is not seen on ultrasound. 

Finding



                                        5: There are normal appearing lymph node

s in the left axilla.In retrospect the 

lymph



                                        nodes are stable in size compared to renata

orMRIs. IMPRESSION:Finding 1:  Biopsy 

clips



                                        in both breasts are benign. Please note 

thatthe left 5:00 MRI guided biopsy 

yielded



                                        papilloma, however the patientelected no

t to have surgery for this area. Finding

2:



                                        Stable calcifications in the left breast

 are benign. Finding 3:  This was 

recommended



                                        for biopsy at an outside facility.Howeve

rachelle the current examination it appears



                                        benign. Finding 4:  Areas of nonmass enh

ancement in the left breast upperouter



                                        quadrant and lower outer quadrant are whitten

spicious. A 2 site leftMR guided breast



                                        biopsy is recommended. Finding 5: Left a

xillary lymph nodes are benign. Findings

and



                                        recommendations were discussed with the 

patient. BI-RADS Category 4:Suspicious



                                        Abnormality [place]'s 2012 "Dense

 Breast Law" states:If your mammogram



                                        demonstrates that you have dense breast 

tissue, whichcould hide small 

abnormalities,



                                        you might benefit from supplementaryscre

ening tests, which can include a breast



                                        ultrasound screening or abreast MRI exam

ination, or both, depending on your



                                        individual riskfactors.A report of your 

mammography results, which contains



                                        information aboutyour breast density, ha

s been sent to your physicians office, 

and



                                        youshould contact your physician if you 

have any questions or concernsabout this



                                        report.











          Name      Value     Range     Interpretation Code Description Data Radha

rce(s) Supporting



                                                                      Document(s

)

 

                                                                      











                    ID                  Date                Data Source

 

                    [id number]            07/10/2020 11:08:12 AM ED [facility]









                                        ORIGINAL: Fri Jul 10, 2020 11:08 AM by Rory Santana MDHISTORY:Patient is

64



                                        years old and is seen for diagnostic and

 personal history of breast cancer in



                                        theright breast. The patient's last clin

ical breast exam was on 2020. The



                                        patient has a historyof bilateral MRI Bi

opsy in , left needle 

biopsy in



                                         - benign, right lumpectomy [id number] -

 malignant and bilateral excisional 

biopsy



                                        more than 10 years ago - benign.  The pa

nikole hasa history of right breast 

cancer in



                                        .  The patient has the following fam

helena history of breastcancer:  mother, at

age



                                        74, breast cancer. FILMS COMPARED:The pr

esent examination has been compared to 

prior



                                        [facility], 

and



                                        at [facility] on 2019, 2019, 10/28/2019 and 

2020.



                                        MAMMOGRAM FINDINGS:The following mammogr

aphic views were obtained: bilateral cc



                                        spotcompression with tomosynthesis, bila

teral spot lateral with tomosynthesis,



                                        bilateral craniocaudalwith tomosynthesis

, bilateral mediolateral oblique with



                                        tomosynthesis, bilateral 90 degree later

alwith tomosynthesis. Computer-aided



                                        detection was utilized by the radiologis

t in the interpretationof this 

examination.



                                        The breasts are heterogeneously dense, w

hich may obscure small masses. Finding 

1:



                                        There are multiple biopsy clips in both 

breasts. Finding 2:  There are stable



                                        punctate calcifications with regional di

stribution in the lateralregion of the 

left



                                        breast. ULTRASOUND FINDINGS:High resolut

ion real time scanning was performed of 

all



                                        four quadrants andthe retroareolar regio

ns of both breasts. Finding 3:  There is

a



                                        cyst measuring 5 millimeters in the left

 breast at 5 o'clock located 

2centimeters



                                        from the nipple.  Internal echotexture i

s anechoic. There is a single



                                        punctatecalcification noted. Finding 4: 

 The finding in question is not seen on



                                        ultrasound. Finding 5: There are normal 

appearing lymph nodes in the left 

axilla. In



                                        retrospect the lymph nodesare stable in 

size compared to prior MRIs.



                                        IMPRESSION:Finding 1:  Biopsy clips in b

oth breasts are benign. Please note that

the



                                        left 5:00 MRI guidedbiopsy yielded papil

keith, however the patient elected not to

have



                                        surgery for this area. Finding 2:  Stabl

e calcifications in the left breast are



                                        benign. Finding 3:  This was recommended

 for biopsy at an outside facility. 

Howeveron



                                        the current examination it appears benig

n. Finding 4:  Areas of nonmass 

enhancement



                                        in the left breast upper outer quadrant 

and lower outerquadrant are suspicious. 

A 2



                                        site left MR guided breast biopsy is rec

ommended. Finding 5: Left axillary lymph



                                        nodes are benign. Findings and recommend

ations were discussed with the patient.



                                        BI-RADS Category 4:Suspicious Abnormalit

y [place]'s 2012 "Dense Breast 

Law"



                                        states:If your mammogram demonstrates th

at you have dense breast tissue, 

whichcould



                                        hide small abnormalities, you might bene

fit from supplementaryscreening tests, 

which



                                        can include a breast ultrasound screenin

g or abreast MRI examination, or both,



                                        depending on your individual risk factor

s.A report of your mammography results, 

which



                                        contains information about your breast d

ensity, hasbeen sent to your physicians



                                        office, and you should contact your phys

ician if you have anyquestions or 

concerns



                                        about this report.











          Name      Value     Range     Interpretation Code Description Data Radha

rce(s) Supporting



                                                                      Document(s

)

 

                                                                      











                    ID                  Date                Data Source

 

                    [id number]            [facility]









                                        ORIGINAL: Fri Jul 10, 2020 11:08 AM by Rory Santana MDHISTORY:Patient is

64



                                        years old and is seen for diagnostic and

 personal history of breast cancer in



                                        theright breast. The patient's last clin

ica breast exam was on 2020. The



                                        patient has a historyof bilateral MRI Bi

opsy in , left needle 

biopsy in



                                         - benign, right lumpectomy [id number] -

 malignant and bilateral excisional 

biopsy



                                        more than 10 years ago - benign.  The pa

tient hasa history of right breast 

cancer in



                                        .  The patient has the following fam

helena history of breastcancer:  mother, at

age



                                        74, breast cancer. FILMS COMPARED:The pr

esent examination has been compared to 

prior



                                        imaging [facility]performed [facility]

[facility] on 2020 and 2020, 

and



                                        [facility], 10/28/2019 and 

2020.



                                        MAMMOGRAM FINDINGS:The following mammogr

aphic views were obtained: bilateral cc



                                        spotcompression with tomosynthesis, bila

teral spot lateral with tomosynthesis,



                                        bilateral craniocaudalwith tomosynthesis

, bilateral mediolateral oblique with



                                        tomosynthesis, bilateral 90 degree later

alwith tomosynthesis. Computer-aided



                                        detection was utilized by the radiologis

t in the interpretationof this 

examination.



                                        The breasts are heterogeneously dense, w

hich may obscure small masses. Finding 

1:



                                        There are multiple biopsy clips in both 

breasts. Finding 2:  There are stable



                                        punctate calcifications with regional di

stribution in the lateralregion of the 

left



                                        breast. ULTRASOUND FINDINGS:High resolut

ion real time scanning was performed of 

all



                                        four quadrants andthe retroareolar regio

ns of both breasts. Finding 3:  There is

a



                                        cyst measuring 5 millimeters in the left

 breast at 5 o'clock located 

2centimeters



                                        from the nipple.  Internal echotexture i

s anechoic. There is a single



                                        punctatecalcification noted. Finding 4: 

 The finding in question is not seen on



                                        ultrasound. Finding 5: There are normal 

appearing lymph nodes in the left 

axilla. In



                                        retrospect the lymph nodesare stable in 

size compared to prior MRIs.



                                        IMPRESSION:Finding 1:  Biopsy clips in b

oth breasts are benign. Please note that

the



                                        left 5:00 MRI guidedbiopsy yielded papil

keith, however the patient elected not to

have



                                        surgery for this area. Finding 2:  Stabl

e calcifications in the left breast are



                                        benign. Finding 3:  This was recommended

 for biopsy at an outside facility. 

Howeveron



                                        the current examination it appears benig

n. Finding 4:  Areas of nonmass 

enhancement



                                        in the left breast upper outer quadrant 

and lower outerquadrant are suspicious. 

A 2



                                        site left MR guided breast biopsy is rec

ommended. Finding 5: Left axillary lymph



                                        nodes are benign. Findings and recommend

ations were discussed with the patient.



                                        BI-RADS Category 4:Suspicious Abnormalit

y [place]'s 2012 "Dense Breast 

Law"



                                        states:If your mammogram demonstrates th

at you have dense breast tissue, 

whichcould



                                        hide small abnormalities, you might bene

fit from supplementaryscreening tests, 

which



                                        can include a breast ultrasound screenin

g or abreast MRI examination, or both,



                                        depending on your individual risk factor

s.A report of your mammography results, 

which



                                        contains information about your breast d

ensity, hasbeen sent to your physicians



                                        office, and you should contact your phys

ician if you have anyquestions or 

concerns



                                        about this report.











          Name      Value     Range     Interpretation Code Description Data Radha

rce(s) Supporting



                                                                      Document(s

)

 

                                                                      











                    ID                  Date                Data Source

 

                    [id number]            2020 02:16:40 PM EDT [facility]









                                        ORIGINAL:   2:16 PM by Gallito Alvares MDEXAMINATION:MRI 

BREAST CHANTELLE



                                        WO AND W CONTRAST AND CADHISTORY:Maligna

nt neoplasm of breast (female).   

Patient has



                                        a history of breast cancer in the right 

breastand a family history of breast 

cancer.



                                        Patient has a history of bilateral MRI b

iopsies in 2017, left needle 

biopsy in



                                        , right lumpectomy in  and bilat

eral excisional biopsies morethan 10 

years



                                        ago which were benign.  The patient has 

a history of right breast cancer in



                                        .COMPARISON:This is compared to a pr

ior MRI from 10/28/2019 and to a prior



                                        mammogram and ultrasound from2019.

TECHNIQUE:Multiple axial images of both



                                        breasts were performed with and without 

the infusion of 14 mL ofProHance.  The



                                        infusion was performed without incident 

after written and informed consent



                                        wasobtained.  Dynamic contrast enhanced 

gradient echo imaging was performed 

following



                                        theadministration of the contrast materi

al.  Contrast enhanced images were 

obtained



                                        at 1 minute, 2minutes and 6 minutes.  Hi

gh-resolution postcontrast images were



                                        obtained at 3 minutes.  Imageswere inter

preted on a dedicated workstation with



                                        anatomic subtraction in computer-aided e

valuation.FINDINGS:There is 

redemonstration



                                        of multiple enhancing foci within both b

reasts, more so on the left whencompared

to



                                        the right.  The overall pattern of enhan

cement is unchanged from previously.



                                        Withinthe enhancing areas in the left br

east there are 2 new more conglomerate 

areas



                                        of enhancement.  1is seen within the upp

er outer quadrant, best appreciated on 

image



                                        207, measuring 7 mm.  This islocated 3.7

 cm from the nipple.  The 2nd more



                                        ill-defined enhancing area (image 186) i

s seen withinthe lower outer quadrant of

the



                                        left breast measuring 6 mm located 4.2 c

m from the nipple.  Thereare no other 

new



                                        enhancing foci appreciated either breast

.  Please note that the numerous



                                        enhancingfoci that are identified make i

t difficult to exclude a small neoplasm.

 At



                                        the current time thereare slightly promi

nent lymph nodes in the left axilla, 1 

of



                                        which has loss of fatty hilum.  Thereare

 no chest wall abnormalities.  The 

patient is



                                        again seen to be status post right axill

anastasiia lymphnode dissection.IMPRESSION:1.



                                        Extensive enhancement throughout both br

easts limiting the evaluation.2. 2 new



                                        enhancing areas within the left breast, 

1 within the upper outer quadrant and 

the



                                        2ndwithin the lower outer quadrant.  Sec

ond-look ultrasound is recommended.  If 

not



                                        seen on ultrasoundthan an MRI directed b

iopsy would be recommended.3. Slightly



                                        prominent left axillary lymph nodes with

 loss of fatty hilum of 1 of the 2



                                        enlargedlymph nodes.  Recommend ultrasou

nd correlation with biopsy as deemed



                                        necessary based on ultrasoundfindings.BI

-RADS 0: Additional imaging evaluation



                                        recommended.











          Name      Value     Range     Interpretation Code Description Data Radha

rce(s) Supporting



                                                                      Document(s

)

 

                                                                      











                    ID                  Date                Data Source

 

                    [id number]           2020 11:14:47 AM EDT [facility]











          Name      Value     Range     Interpretation Description Data      Sup

porting



                                        Code                Source(s) Document(s

)

 

          Transcription                                         [facility]                                         Health    



          Interface Message                                                   



          Text                                                        









                                        Patient name and date of birth verified,

 history, allergies, and 

medicationsreviewed.



                                        Patient education form completed. IV acc

ess obtained.











                    ID                  Date                Data Source

 

                    [id number]            10/28/2019 02:28:33 PM EDT [facility]









                                        ORIGINAL: Mon Oct 28, 2019  2:28 PM by Gallito Alvares MDHISTORY:Patient is 64



                                        years old and is seen for diagnostic,per

norma history of breast cancer in the



                                        rightbreast and family history of breast

 cancer. The patient's last clinical 

breast



                                        exam was on 2019.The patient has a hi

story of bilateral MRI Biopsy in 

, left needle biopsy in  -benign

, right lumpectomy in  - malignant 

and



                                        bilateral excisional biopsy more than 10

 years ago- benign.  The patient has a



                                        history of right breast cancer in . 

 The patient has the followingfamily 

history



                                        of breast cancer:  mother, at age 74, br

east cancer. FILMS COMPARED:The present



                                        examination has been compared to prior i

maging studies performed at



                                        [facility] on  and 2019. BREAST MRI:Multiple 

axial



                                        images of both breasts were performed wi

th and without infusion of 13.9 cc's



                                        ofprohance contrast, administered intrav

enously without incident after written



                                        informed consent wasobtained.  Dynamic c

ontrast enhanced gradient echo imaging 

was



                                        performed following theadministration of

 Omni scan. Contrast enhanced images 

were



                                        obtained at 1 minute, 2 minutes, and 6mi

nutes. High resolution post contrast 

images



                                        were obtained at 3 minutes. Images were 

interpreted on a dedicated workstation 

with



                                        automatic subtraction and computer aided

evaluation.   There is extensive 

background



                                        enhancement both breasts. Finding 1:  Th

ere are multiple enhancing foci in both



                                        breasts. These are more numerous on the 

left.  A similar finding was seen on the

last



                                        two MRIs from 2019and 2017.  No one

 lesion is more suspicious than another.



                                        There are also stable foci of nonmass li

ke enhancement within the right breast. 

No



                                        new suspicious foci are seen. Finding 2:

  Post lumpectomy changes are again seen

on



                                        the right.  The patient is also again se

ento be status post right axillary lymph

node



                                        dissection.  Normal appearing left axill

anastasiia lymph nodesare again seen.



                                        IMPRESSION:Finding 1:  Multiple enhancin

g foci in both breasts are probably 

benign.



                                        Follow-up in 6 months isrecommended. Fin

ding 2:  Area in the right breast is 

benign.



                                        BI-RADS Category 3:Probably Benign











          Name      Value     Range     Interpretation Code Description Data Radha

rce(s) Supporting



                                                                      Document(s

)

 

                                                                      











                    ID                  Date                Data Source

 

                    [id number]            [facility]









                                        ORIGINAL: Mon Oct 28, 2019  9:44 AM by Gallito Alvares MDEXAMINATION:BD BONE



                                        MINERAL DENSITY DEXASCANHISTORY:Malignan

t neoplasm of breast



                                        (female)COMPARISON:None.TECHNIQUE:Dual x

-ray absorptiometry was performed of the



                                        lumbar spine and both hips.FINDINGS:1. B

MD as determined from AP Spine L2-L4 is 

1.242



                                        g/cm2 with a T-Score of 0.5.  This is co

nsistentwith normal bone density.2. BMD 

as



                                        determined from Femur Neck Left is 1.134

 g/cm2 with a T-Score of 0.7.  This is



                                        consistentwith normal bone density.3. BM

D as determined from Femur Neck Right is



                                        1.089 g/cm2 with a T-Score of 0.4.  This

 isconsistent with normal bone 

density.4. BMD



                                        as determined from Femur Total Left is 1

.165 g/cm2 with a T-Score of 1.3.  This



                                        isconsistent with normal bone density.5.

 BMD as determined from Femur Total 

Right is



                                        1.121 g/cm2 with a T-Score of 0.9.  This

 isconsistent with normal bone



                                        density.ASSESSMENT:World Health Organiza

tion-Definition of Osteoporosis and



                                        Osteopenia for  Women*:Normal: 

  T-Score at or above -1 SDOsteopenia:



                                        T-Score between -1 and -2.5 SDOsteoporos

is:   T-Score at or below -2.5 

SDEstablished



                                        Osteoporosis:  T-Score at or below - 2.5

 SD plus fragility factor*WHO 

definitions



                                        only apply when a young healthy Caucasia

n Women reference database is used



                                        todetermine T-Scores.IMPRESSION:National

 Osteoporosis Foundation (NOF) 

guidelines



                                        recommend initiating therapy to reduce f

racturerisk in women with BMD:T-Score 

below -



                                        2 SDT-Score below - 1.5 SD with other ri

sk factors present











          Name      Value     Range     Interpretation Code Description Data Radha

rce(s) Supporting



                                                                      Document(s

)

 

                                                                      











                    ID                  Date                Data Source

 

                    [id number]           10/28/2019 08:22:09 AM EDT [facility]











          Name      Value     Range     Interpretation Description Data      Sup

porting



                                        Code                Source(s) Document(s

)

 

          Transcription                                         [facility]                                         Health    



          Interface Message                                                   



          Text                                                        









                                        Procedure explained to pt, understanding

 verbalized. Iv placed. 

Reviewedallergies and



                                        history with pt. Discharge instructions 

also reviewed.









                                        Procedure

 

                                        







Social History







           Code       Duration   Value      Status     Description Data Source(s

)

 

           Alcohol intake 2020 Current drinker                       [employer]



                      12:00:00 AM EDT of alcohol                       



                                 (finding)                        

 

           Tobacco smoking 2020 Former smoker                       [facility]



           status [facility] 12:00:00 AM EDT                                  

 

           Tobacco smoking 2020 Former smoker                       [facility]



           status [facility] 12:00:00 AM EDT                                  

 

           Alcohol intake 2020 Current drinker                       [employer]



                      12:00:00 AM EDT of alcohol                       



                                 (finding)                        

 

           Alcohol intake 2020 Current drinker                       [employer]



                      12:00:00 AM EDT of alcohol                       



                                 (finding)                        

 

           Tobacco smoking 2020 Former smoker                       [facility]



           status [facility] 12:00:00 AM EDT                                  

 

           Tobacco smoking 10/28/2019 Former smoker                       [facility]



           status [facility] 12:00:00 AM EDT                                  

 

           Alcohol intake 10/28/2019 Yes                              [facility]



                      12:00:00 AM EDT                                  







Vital Signs







                    ID                  Date                Data Source

 

                    UNK                                     











           Name       Value      Range      Interpretation Code Description Data

 Source(s)

 

           Oxygen saturation in 100 %                            100 %      [facility]



           Arterial blood by                                             



           Pulse oximetry                                             

 

           Body weight 70.308 kg                        70.308 kg  [facility]

 

           Body height 170.2 cm                         170.2 cm   [facility]

 

           Respiratory rate 18 /min                          18 /min    [facility]

 

           Body temperature 37.06 Serenity                        37.06 Serenity  [facility]

 

           Heart rate 72 /min                          72 /min    [facility]

 

           Diastolic blood 80 mm[Hg]                        80 mm[Hg]  [facility]



           pressure                                               

 

           Systolic blood 130 mm[Hg]                       130 mm[Hg] [facility]



           pressure                                               

 

           Body temperature 35.94 Serenity                        35.94 Serenity  [facility]

## 2020-10-14 NOTE — PATH
Surgical Pathology Report



Patient Name:  PRATIMA CAUSEY

Accession #:  [id number]

[facility]. Rec. #:  [id number]                                                        

   /Age/Gender:  1955 (Age: 65) / F

Account:  [id number]                                                          

             Location: [facility] 

Taken:  10/8/2020

Received:  10/8/2020

Reported:  10/14/2020

Physicians:  Remington Wilson M.D.

  



Specimen(s) Received

 PORT-A-CATH WITH CAPSULE 





Clinical History

History of right breast cancer 5 years ago







Final Diagnosis

PORT-A-CATH WITH CAPSULE, EXCISION:

DENSE FIBROUS CAPSULE AND FIBROADIPOSE TISSUE WITH CHRONIC INFLAMMATION AND

HEMOSIDERIN- LADEN MACROPHAGE DEPOSITION.

PORT-A-CATH. MACROSCOPIC DIAGNOSIS.





***Electronically Signed***

Lizzie Dela Cruz M.D.





Gross Description

Received in formalin labeled "Port-A-Cath with capsule," is a 2.5 x 2.5 x 1.3 cm

white foreign body, consistent with a Port-A-Cath. The Port-A-Cath displays a

21.5 cm portion of blue tubing extending from one aspect. Separately received

within the same container is a 2.4 x 1.5 x 0.2 cm portion of tan gray

fibromembranous tissue with minimal attached fat, consistent with a fibrous

capsule. The fibrous capsule is serially sectioned and entirely submitted in one

cassette.

/10/12/2020



[facility]/10/12/2020

## 2021-03-24 ENCOUNTER — APPOINTMENT (OUTPATIENT)
Dept: BREAST CENTER | Facility: CLINIC | Age: 66
End: 2021-03-24

## 2021-04-15 PROBLEM — Z00.00 ENCOUNTER FOR PREVENTIVE HEALTH EXAMINATION: Status: ACTIVE | Noted: 2021-04-15

## 2021-04-21 ENCOUNTER — APPOINTMENT (OUTPATIENT)
Dept: BREAST CENTER | Facility: CLINIC | Age: 66
End: 2021-04-21
Payer: COMMERCIAL

## 2021-04-21 VITALS
SYSTOLIC BLOOD PRESSURE: 141 MMHG | DIASTOLIC BLOOD PRESSURE: 85 MMHG | WEIGHT: 150 LBS | HEIGHT: 67 IN | BODY MASS INDEX: 23.54 KG/M2

## 2021-04-21 DIAGNOSIS — Z85.3 PERSONAL HISTORY OF MALIGNANT NEOPLASM OF BREAST: ICD-10-CM

## 2021-04-21 DIAGNOSIS — R92.2 INCONCLUSIVE MAMMOGRAM: ICD-10-CM

## 2021-04-21 DIAGNOSIS — Z87.891 PERSONAL HISTORY OF NICOTINE DEPENDENCE: ICD-10-CM

## 2021-04-21 DIAGNOSIS — Z80.3 FAMILY HISTORY OF MALIGNANT NEOPLASM OF BREAST: ICD-10-CM

## 2021-04-21 DIAGNOSIS — Z80.41 FAMILY HISTORY OF MALIGNANT NEOPLASM OF OVARY: ICD-10-CM

## 2021-04-21 DIAGNOSIS — D24.2 BENIGN NEOPLASM OF LEFT BREAST: ICD-10-CM

## 2021-04-21 DIAGNOSIS — Z80.42 FAMILY HISTORY OF MALIGNANT NEOPLASM OF PROSTATE: ICD-10-CM

## 2021-04-21 PROCEDURE — 99072 ADDL SUPL MATRL&STAF TM PHE: CPT

## 2021-04-21 PROCEDURE — 99213 OFFICE O/P EST LOW 20 MIN: CPT

## 2021-04-21 NOTE — PAST MEDICAL HISTORY
[Postmenopausal] : The patient is postmenopausal [Menarche Age ____] : age at menarche was [unfilled] [Menopause Age____] : age at menopause was [unfilled] [Total Preg ___] : G[unfilled] [Live Births ___] : P[unfilled]  [Age At Live Birth ___] : Age at live birth: [unfilled] [History of Hormone Replacement Treatment] : has no history of hormone replacement treatment [de-identified] : bra size 38C

## 2021-04-21 NOTE — REVIEW OF SYSTEMS
[Recent Weight Loss (___ Lbs)] : recent [unfilled] ~Ulb weight loss [Dry Eyes] : dryness of the eyes [As Noted in HPI] : as noted in HPI [Heartburn] : heartburn [Negative] : Heme/Lymph

## 2021-04-21 NOTE — REASON FOR VISIT
[Follow-Up: _____] : a [unfilled] follow-up visit [FreeTextEntry1] : The patient comes in with a strong family history of breast cancer and ovarian cancer and a personal history of a right breast triple negative breast cancer diagnosed in 2015 for which she underwent a right breast partial mastectomy and sentinel lymph node biopsy by Dr. Melia Waters at [facility].  She had a 3 cm triple negative breast cancer and had adjuvant chemotherapy and radiation therapy and genetic testing with a comprehensive BRCA panel in 2014 was negative.  She was found to have some findings on MRI in June 2020 in the left breast upper and lower outer quadrants and MRI biopsies showed benign papillary neoplasms and she comes in for routine follow-up.

## 2021-04-21 NOTE — ASSESSMENT
[FreeTextEntry1] : The patient is a 65-year-old G1, P1 postmenopausal female of -American descent.  She underwent menopause at age 53 and never took any hormone replacement therapy.  She underwent menarche at age 17 and had her first child at age 32.  She has a strong family history with her mother who had breast cancer at age 75.  Her maternal grandmother passed away from ovarian cancer.  Her father passed away from prostate cancer at age 65.  The patient tested BRCA negative in November 2014.  The patient herself underwent a right breast partial mastectomy by Dr. Melia Waters in 2015 at age 60 for a 3 cm moderately differentiated invasive duct cancer.  This was a triple negative breast cancer which was pathologic prognostic stage IIA and she underwent adjuvant chemotherapy and radiation.  According to the patient her sentinel lymph nodes were negative.  She did undergo a separate left breast core biopsy in 2017 which showed a benign papilloma.  She then underwent an MRI in June 2020 at [facility] which showed a couple areas of enhancement in the left breast upper and lower outer quadrants.  Mammography and ultrasound showed stable calcifications.  She underwent 2 separate left breast MRI guided core biopsies in July 2020 which showed benign papillary neoplasms and she had a "barbell" clip placed in the more inferior site and a "bracket" clip placed in the more superior site.  She follows up with Dr. Hernandez her medical oncologist, routinely.  Slide review of her left breast MRI core biopsies from 2020 confirmed intraductal papillomas with no atypia and I removed her Port-A-Cath in October 2020.  On exam today I cannot feel any suspicious densities in either breast.  The patient has a diagnostic bilateral mammography and ultrasound scheduled by her oncologist, Dr. Hernandez, in May 2021.  I would like her to get a follow-up bilateral breast MRI as well due to these recent biopsies in 2020 showing benign papillomas.  She will get that bilateral breast MRI around July 2021.  I can see her again in 1 year if there are no suspicious findings on any of those studies.

## 2021-04-21 NOTE — PHYSICAL EXAM
[Normocephalic] : normocephalic [Atraumatic] : atraumatic [EOMI] : extra ocular movement intact [Supple] : supple [No Supraclavicular Adenopathy] : no supraclavicular adenopathy [No Cervical Adenopathy] : no cervical adenopathy [Examined in the supine and seated position] : examined in the supine and seated position [No dominant masses] : no dominant masses in right breast  [No dominant masses] : no dominant masses left breast [No Nipple Retraction] : no left nipple retraction [No Nipple Discharge] : no left nipple discharge [Breast Mass Right Breast ___cm] : no masses [Breast Mass Left Breast ___cm] : no masses [Breast Nipple Inversion] : nipples not inverted [Breast Nipple Retraction] : nipples not retracted [Breast Nipple Fissures] : nipples not fissured [Breast Nipple Flattening] : nipples not flattened [No Axillary Lymphadenopathy] : no left axillary lymphadenopathy [No Edema] : no edema [No Rashes] : no rashes [No Ulceration] : no ulceration [de-identified] : On exam, the patient has A-cup breasts.  She has a well-healed wide excision scar in the right breast.  She does have hypertrophic scar from her Port-A-Cath removal site.  I cannot feel any suspicious densities in either breast.  She has no axillary, supraclavicular, or cervical adenopathy. [de-identified] : Status post right breast partial mastectomy with well-healed wide excision scar but no evidence of recurrence. [de-identified] : Hypertrophic scar over Port-A-Cath excision site

## 2021-04-21 NOTE — HISTORY OF PRESENT ILLNESS
[FreeTextEntry1] : The patient is a 65-year-old G1, P1 postmenopausal female of -American descent.  She underwent menopause at age 53 and never took any hormone replacement therapy.  She underwent menarche at age 17 and had her first child at age 32.  She has a strong family history with her mother who had breast cancer at age 75.  Her maternal grandmother passed away from ovarian cancer.  Her father passed away from prostate cancer at age 65.  The patient tested BRCA negative in November 2014.  The patient herself underwent a right breast partial mastectomy by Dr. Melia Waetrs in 2015 at age 60 for a 3 cm moderately differentiated invasive duct cancer.  This was a triple negative breast cancer which was pathologic prognostic stage IIA and she underwent adjuvant chemotherapy and radiation.  According to the patient her sentinel lymph nodes were negative.  She did undergo a separate left breast core biopsy in 2017 which showed a benign papilloma.  She then underwent an MRI in June 2020 at [facility] which showed a couple areas of enhancement in the left breast upper and lower outer quadrants.  Mammography and ultrasound showed stable calcifications.  She underwent 2 separate left breast MRI guided core biopsies in July 2020 which showed benign papillary neoplasms and she had a "barbell" clip placed in the more inferior site and a "bracket" clip placed in the more superior site.  She follows up with Dr. Hernandez her medical oncologist, routinely.  Slide review of her left breast MRI core biopsies from 2020 confirmed intraductal papillomas with no atypia and I removed her Port-A-Cath in October 2020.  She comes in now for surgical follow-up.

## 2021-05-11 ENCOUNTER — NON-APPOINTMENT (OUTPATIENT)
Age: 66
End: 2021-05-11

## 2021-08-05 ENCOUNTER — NON-APPOINTMENT (OUTPATIENT)
Age: 66
End: 2021-08-05

## 2021-08-24 ENCOUNTER — NON-APPOINTMENT (OUTPATIENT)
Age: 66
End: 2021-08-24

## 2024-01-05 ENCOUNTER — NON-APPOINTMENT (OUTPATIENT)
Age: 69
End: 2024-01-05

## 2024-01-16 ENCOUNTER — APPOINTMENT (OUTPATIENT)
Dept: BREAST CENTER | Facility: CLINIC | Age: 69
End: 2024-01-16

## 2024-01-22 ENCOUNTER — NON-APPOINTMENT (OUTPATIENT)
Age: 69
End: 2024-01-22

## 2024-01-22 NOTE — PAST MEDICAL HISTORY
[Postmenopausal] : The patient is postmenopausal [Menarche Age ____] : age at menarche was [unfilled] [Menopause Age____] : age at menopause was [unfilled] [Total Preg ___] : G[unfilled] [Live Births ___] : P[unfilled]  [Age At Live Birth ___] : Age at live birth: [unfilled] [History of Hormone Replacement Treatment] : has no history of hormone replacement treatment [de-identified] : bra size 38C

## 2024-01-22 NOTE — HISTORY OF PRESENT ILLNESS
[FreeTextEntry1] : The patient is a 68-year-old G1, P1 postmenopausal female of -American descent.  She underwent menopause at age 53 and never took any hormone replacement therapy.  She underwent menarche at age 17 and had her first child at age 32.  She has a strong family history with her mother who had breast cancer at age 75.  Her maternal grandmother passed away from ovarian cancer.  Her father passed away from prostate cancer at age 65.  The patient tested BRCA negative in November 2014.  The patient herself underwent a right breast partial mastectomy by Dr. Melia Waters in 2015 at age 60 for a 3 cm moderately differentiated invasive duct cancer.  This was a triple negative breast cancer which was pathologic prognostic stage IIA and she underwent adjuvant chemotherapy and radiation.  According to the patient her sentinel lymph nodes were negative.  She did undergo a separate left breast core biopsy in 2017 which showed a benign papilloma.  She then underwent an MRI in June 2020 at [facility] which showed a couple areas of enhancement in the left breast upper and lower outer quadrants.  Mammography and ultrasound showed stable calcifications.  She underwent 2 separate left breast MRI guided core biopsies in July 2020 which showed benign papillary neoplasms and she had a "barbell" clip placed in the more inferior site and a "bracket" clip placed in the more superior site.  She follows up with Dr. Hernandez her medical oncologist, routinely.  Slide review of her left breast MRI core biopsies from 2020 confirmed intraductal papillomas with no atypia and I removed her Port-A-Cath in October 2020.  She comes in now for interval follow-up.

## 2024-01-22 NOTE — PHYSICAL EXAM
[Normocephalic] : normocephalic [Atraumatic] : atraumatic [EOMI] : extra ocular movement intact [Supple] : supple [No Supraclavicular Adenopathy] : no supraclavicular adenopathy [No Cervical Adenopathy] : no cervical adenopathy [Examined in the supine and seated position] : examined in the supine and seated position [No dominant masses] : no dominant masses in right breast  [No dominant masses] : no dominant masses left breast [No Nipple Retraction] : no left nipple retraction [No Nipple Discharge] : no left nipple discharge [Breast Mass Right Breast ___cm] : no masses [Breast Mass Left Breast ___cm] : no masses [No Axillary Lymphadenopathy] : no left axillary lymphadenopathy [No Edema] : no edema [No Rashes] : no rashes [No Ulceration] : no ulceration [Breast Nipple Inversion] : nipples not inverted [Breast Nipple Retraction] : nipples not retracted [Breast Nipple Fissures] : nipples not fissured [Breast Nipple Flattening] : nipples not flattened [de-identified] : On exam, the patient has A-cup breasts.  She has a well-healed wide excision scar in the right breast.  She does have hypertrophic scar from her Port-A-Cath removal site.  I cannot feel any suspicious densities in either breast.  She has no axillary, supraclavicular, or cervical adenopathy. [de-identified] : Status post right breast partial mastectomy with well-healed wide excision scar but no evidence of recurrence. [de-identified] : Hypertrophic scar over Port-A-Cath excision site

## 2024-01-22 NOTE — ASSESSMENT
[FreeTextEntry1] : The patient is a 68-year-old G1, P1 postmenopausal female of -American descent. She underwent menopause at age 53 and never took any hormone replacement therapy. She underwent menarche at age 17 and had her first child at age 32. She has a strong family history with her mother who had breast cancer at age 75. Her maternal grandmother passed away from ovarian cancer. Her father passed away from prostate cancer at age 65. The patient tested BRCA negative in November 2014. The patient herself underwent a right breast partial mastectomy by Dr. Melia Waters in 2015 at age 60 for a 3 cm moderately differentiated invasive duct cancer. This was a triple negative breast cancer which was pathologic prognostic stage IIA and she underwent adjuvant chemotherapy and radiation. According to the patient her sentinel lymph nodes were negative. She did undergo a separate left breast core biopsy in 2017 which showed a benign papilloma. She then underwent an MRI in June 2020 at [facility] which showed a couple areas of enhancement in the left breast upper and lower outer quadrants. Mammography and ultrasound showed stable calcifications. She underwent 2 separate left breast MRI guided core biopsies in July 2020 which showed benign papillary neoplasms and she had a "barbell" clip placed in the more inferior site and a "bracket" clip placed in the more superior site. She follows up with Dr. Hernandez her medical oncologist, routinely. Slide review of her left breast MRI core biopsies from 2020 confirmed intraductal papillomas with no atypia and I removed her Port-A-Cath in October 2020. On exam today I cannot feel any suspicious densities in either breast. The patient underwent her last bilateral mammography and ultrasound which was reviewed from ????????????. She underwent her last breast MRI on August 23, 2021 at [facility] showing stable postop changes in the right breast and the 2 previously biopsied regions in the left breast.  She will be due for her next bilateral mammography and ultrasound in ???????? and she was given prescriptions.  I can see her again in 1 year if there are no suspicious findings on any of those studies.  She continues to follow-up with her medical oncologist, Dr. Hernandez.

## 2024-01-22 NOTE — REASON FOR VISIT
No [Follow-Up: _____] : a [unfilled] follow-up visit [FreeTextEntry1] : The patient comes in with a strong family history of breast cancer and ovarian cancer and a personal history of a right breast triple negative breast cancer diagnosed in 2015 for which she underwent a right breast partial mastectomy and sentinel lymph node biopsy by Dr. Melia Waters at [facility].  She had a 3 cm triple negative breast cancer and had adjuvant chemotherapy and radiation therapy and genetic testing with a comprehensive BRCA panel in 2014 was negative.  She was found to have some findings on MRI in June 2020 in the left breast upper and lower outer quadrants and MRI biopsies showed benign papillary neoplasms and she comes in for routine follow-up.

## 2024-01-30 NOTE — ASSESSMENT
I have reviewed the below EKG. I was not otherwise involved in this patient's care. EKG  The Ekg interpreted by myself  normal sinus rhythm with a rate of 70  Axis is   Normal  QTc is  normal  Intervals and Durations are unremarkable. No specific ST-T wave changes appreciated. No evidence of acute ischemia.    No significant change from prior EKG dated July 13, 2021        Kody Meléndez MD  11/04/21 2113 [FreeTextEntry1] : The patient is a 68-year-old G1, P1 postmenopausal female of -American descent. She underwent menopause at age 53 and never took any hormone replacement therapy. She underwent menarche at age 17 and had her first child at age 32. She has a strong family history with her mother who had breast cancer at age 75. Her maternal grandmother passed away from ovarian cancer. Her father passed away from prostate cancer at age 65. The patient tested BRCA negative in November 2014. The patient herself underwent a right breast partial mastectomy by Dr. Melia Waters in 2015 at age 60 for a 3 cm moderately differentiated invasive duct cancer. This was a triple negative breast cancer which was pathologic prognostic stage IIA and she underwent adjuvant chemotherapy and radiation. According to the patient her sentinel lymph nodes were negative. She did undergo a separate left breast core biopsy in 2017 which showed a benign papilloma. She then underwent an MRI in June 2020 at [facility] which showed a couple areas of enhancement in the left breast upper and lower outer quadrants. Mammography and ultrasound showed stable calcifications. She underwent 2 separate left breast MRI guided core biopsies in July 2020 which showed benign papillary neoplasms and she had a "barbell" clip placed in the more inferior site and a "bracket" clip placed in the more superior site. She follows up with Dr. Hernandez her medical oncologist, routinely. Slide review of her left breast MRI core biopsies from 2020 confirmed intraductal papillomas with no atypia and I removed her Port-A-Cath in October 2020. On exam today I cannot feel any suspicious densities in either breast. The patient underwent her last bilateral mammography and ultrasound which was reviewed from ????????????. She underwent her last breast MRI on August 23, 2021 at [facility] showing stable postop changes in the right breast and the 2 previously biopsied regions in the left breast.  She will be due for her next bilateral mammography and ultrasound in ???????? and she was given prescriptions.  I can see her again in 1 year if there are no suspicious findings on any of those studies.  She continues to follow-up with her medical oncologist, Dr. Hernandez.

## 2024-01-30 NOTE — PHYSICAL EXAM
[Normocephalic] : normocephalic [Atraumatic] : atraumatic [EOMI] : extra ocular movement intact [No Supraclavicular Adenopathy] : no supraclavicular adenopathy [Supple] : supple [No Cervical Adenopathy] : no cervical adenopathy [Examined in the supine and seated position] : examined in the supine and seated position [No dominant masses] : no dominant masses in right breast  [No dominant masses] : no dominant masses left breast [No Nipple Retraction] : no left nipple retraction [No Nipple Discharge] : no left nipple discharge [Breast Mass Right Breast ___cm] : no masses [Breast Mass Left Breast ___cm] : no masses [No Axillary Lymphadenopathy] : no left axillary lymphadenopathy [No Edema] : no edema [No Rashes] : no rashes [No Ulceration] : no ulceration [Breast Nipple Inversion] : nipples not inverted [Breast Nipple Retraction] : nipples not retracted [Breast Nipple Flattening] : nipples not flattened [Breast Nipple Fissures] : nipples not fissured [de-identified] : On exam, the patient has A-cup breasts.  She has a well-healed wide excision scar in the right breast.  She does have hypertrophic scar from her Port-A-Cath removal site.  I cannot feel any suspicious densities in either breast.  She has no axillary, supraclavicular, or cervical adenopathy. [de-identified] : Status post right breast partial mastectomy with well-healed wide excision scar but no evidence of recurrence. [de-identified] : Hypertrophic scar over Port-A-Cath excision site

## 2024-01-30 NOTE — REASON FOR VISIT
[Follow-Up: _____] : a [unfilled] follow-up visit [FreeTextEntry1] : The patient comes in with a strong family history of breast cancer and ovarian cancer and a personal history of a right breast triple negative breast cancer diagnosed in 2015 for which she underwent a right breast partial mastectomy and sentinel lymph node biopsy by Dr. Melia Waters at [facility].  She had a 3 cm triple negative breast cancer and had adjuvant chemotherapy and radiation therapy and genetic testing with a comprehensive BRCA panel in 2014 was negative.  She was found to have some findings on MRI in June 2020 in the left breast upper and lower outer quadrants and MRI biopsies showed benign papillary neoplasms and she comes in for routine follow-up.

## 2024-01-30 NOTE — PAST MEDICAL HISTORY
[Postmenopausal] : The patient is postmenopausal [Menarche Age ____] : age at menarche was [unfilled] [Menopause Age____] : age at menopause was [unfilled] [Total Preg ___] : G[unfilled] [Live Births ___] : P[unfilled]  [Age At Live Birth ___] : Age at live birth: [unfilled] [History of Hormone Replacement Treatment] : has no history of hormone replacement treatment [de-identified] : bra size 38C

## 2024-01-30 NOTE — HISTORY OF PRESENT ILLNESS
[FreeTextEntry1] : The patient is a 68-year-old G1, P1 postmenopausal female of -American descent.  She underwent menopause at age 53 and never took any hormone replacement therapy.  She underwent menarche at age 17 and had her first child at age 32.  She has a strong family history with her mother who had breast cancer at age 75.  Her maternal grandmother passed away from ovarian cancer.  Her father passed away from prostate cancer at age 65.  The patient tested BRCA negative in November 2014.  The patient herself underwent a right breast partial mastectomy by Dr. Melia Waters in 2015 at age 60 for a 3 cm moderately differentiated invasive duct cancer.  This was a triple negative breast cancer which was pathologic prognostic stage IIA and she underwent adjuvant chemotherapy and radiation.  According to the patient her sentinel lymph nodes were negative.  She did undergo a separate left breast core biopsy in 2017 which showed a benign papilloma.  She then underwent an MRI in June 2020 at [facility] which showed a couple areas of enhancement in the left breast upper and lower outer quadrants.  Mammography and ultrasound showed stable calcifications.  She underwent 2 separate left breast MRI guided core biopsies in July 2020 which showed benign papillary neoplasms and she had a "barbell" clip placed in the more inferior site and a "bracket" clip placed in the more superior site.  She follows up with Dr. Hernandez her medical oncologist, routinely.  Slide review of her left breast MRI core biopsies from 2020 confirmed intraductal papillomas with no atypia and I removed her Port-A-Cath in October 2020.  She comes in now for interval follow-up.

## 2024-02-05 ENCOUNTER — NON-APPOINTMENT (OUTPATIENT)
Age: 69
End: 2024-02-05

## 2024-02-06 ENCOUNTER — APPOINTMENT (OUTPATIENT)
Dept: BREAST CENTER | Facility: CLINIC | Age: 69
End: 2024-02-06
Payer: MEDICARE

## 2024-02-06 VITALS — WEIGHT: 150 LBS | HEART RATE: 69 BPM | OXYGEN SATURATION: 98 % | HEIGHT: 67 IN | BODY MASS INDEX: 23.54 KG/M2

## 2024-02-06 DIAGNOSIS — Z90.11 ACQUIRED ABSENCE OF RIGHT BREAST AND NIPPLE: ICD-10-CM

## 2024-02-06 DIAGNOSIS — Z12.39 ENCOUNTER FOR OTHER SCREENING FOR MALIGNANT NEOPLASM OF BREAST: ICD-10-CM

## 2024-02-06 DIAGNOSIS — Z85.3 PERSONAL HISTORY OF MALIGNANT NEOPLASM OF BREAST: ICD-10-CM

## 2024-02-06 PROCEDURE — 99213 OFFICE O/P EST LOW 20 MIN: CPT

## 2024-02-06 NOTE — PAST MEDICAL HISTORY
[Postmenopausal] : The patient is postmenopausal [Menarche Age ____] : age at menarche was [unfilled] [Menopause Age____] : age at menopause was [unfilled] [Total Preg ___] : G[unfilled] [Live Births ___] : P[unfilled]  [Age At Live Birth ___] : Age at live birth: [unfilled] [History of Hormone Replacement Treatment] : has no history of hormone replacement treatment [de-identified] : bra size 38C

## 2024-02-06 NOTE — ASSESSMENT
[FreeTextEntry1] : The patient is a 68-year-old G1, P1 postmenopausal female of -American descent. She underwent menopause at age 53 and never took any hormone replacement therapy. She underwent menarche at age 17 and had her first child at age 32. She has a strong family history with her mother who had breast cancer at age 75. Her maternal grandmother passed away from ovarian cancer. Her father passed away from prostate cancer at age 65. The patient tested BRCA negative in November 2014. The patient herself underwent a right breast partial mastectomy by Dr. Melia Waters in 2015 at age 60 for a 3 cm moderately differentiated invasive duct cancer. This was a triple negative breast cancer which was pathologic prognostic stage IIA and she underwent adjuvant chemotherapy and radiation. According to the patient her sentinel lymph nodes were negative. She did undergo a separate left breast core biopsy in 2017 which showed a benign papilloma. She then underwent an MRI in June 2020 at [facility] which showed a couple areas of enhancement in the left breast upper and lower outer quadrants. Mammography and ultrasound showed stable calcifications. She underwent 2 separate left breast MRI guided core biopsies in July 2020 which showed benign papillary neoplasms and she had a "barbell" clip placed in the more inferior site and a "bracket" clip placed in the more superior site. She follows up with Dr. Hernandez her medical oncologist, routinely. Slide review of her left breast MRI core biopsies from 2020 confirmed intraductal papillomas with no atypia and I removed her Port-A-Cath in October 2020. On exam today I cannot feel any suspicious densities in either breast. The patient underwent her last bilateral mammography and ultrasound in May 2021 which showed no suspicious findings and she is due for a mammography and ultrasound now and has a scheduled up at [facility] on February 13, 2024.  She will send us a copy of the report. She underwent her last breast MRI on August 23, 2021 at [facility] showing stable postop changes in the right breast and the 2 previously biopsied regions in the left breast.  She is moving to [place] at the end of February 2024 and will start getting follow-up with their and was told to have her films burned on CD-ROM and bring him to [place].  If she needs any documents from this office she was told to give us a call.  Otherwise she should continue to get yearly clinical follow-up in [place].

## 2024-02-06 NOTE — HISTORY OF PRESENT ILLNESS
[FreeTextEntry1] : The patient is a 68-year-old G1, P1 postmenopausal female of -American descent.  She underwent menopause at age 53 and never took any hormone replacement therapy.  She underwent menarche at age 17 and had her first child at age 32.  She has a strong family history with her mother who had breast cancer at age 75.  Her maternal grandmother passed away from ovarian cancer.  Her father passed away from prostate cancer at age 65.  The patient tested BRCA negative in November 2014.  The patient herself underwent a right breast partial mastectomy by Dr. Melia Waters in 2015 at age 60 for a 3 cm moderately differentiated invasive duct cancer.  This was a triple negative breast cancer which was pathologic prognostic stage IIA and she underwent adjuvant chemotherapy and radiation.  According to the patient her sentinel lymph nodes were negative.  She did undergo a separate left breast core biopsy in 2017 which showed a benign papilloma.  She then underwent an MRI in June 2020 at [facility] which showed a couple areas of enhancement in the left breast upper and lower outer quadrants.  Mammography and ultrasound showed stable calcifications.  She underwent 2 separate left breast MRI guided core biopsies in July 2020 which showed benign papillary neoplasms and she had a "barbell" clip placed in the more inferior site and a "bracket" clip placed in the more superior site.  She follows up with Dr. Hernandez her medical oncologist, routinely.  Slide review of her left breast MRI core biopsies from 2020 confirmed intraductal papillomas with no atypia and I removed her Port-A-Cath in October 2020.  She comes in now for interval follow-up.

## 2024-02-06 NOTE — PHYSICAL EXAM
[Normocephalic] : normocephalic [Atraumatic] : atraumatic [EOMI] : extra ocular movement intact [Supple] : supple [No Supraclavicular Adenopathy] : no supraclavicular adenopathy [No Cervical Adenopathy] : no cervical adenopathy [Examined in the supine and seated position] : examined in the supine and seated position [No dominant masses] : no dominant masses in right breast  [No dominant masses] : no dominant masses left breast [No Nipple Retraction] : no left nipple retraction [No Nipple Discharge] : no right nipple discharge [Breast Mass Right Breast ___cm] : no masses [Breast Mass Left Breast ___cm] : no masses [No Axillary Lymphadenopathy] : no left axillary lymphadenopathy [No Edema] : no edema [No Rashes] : no rashes [No Ulceration] : no ulceration [Breast Nipple Inversion] : nipples not inverted [Breast Nipple Retraction] : nipples not retracted [Breast Nipple Flattening] : nipples not flattened [Breast Nipple Fissures] : nipples not fissured [de-identified] : Status post right breast partial mastectomy with well-healed wide excision scar but no evidence of recurrence. [de-identified] : On exam, the patient has A-cup breasts.  She has a well-healed wide excision scar in the right breast.  She does have hypertrophic scar from her Port-A-Cath removal site.  I cannot feel any suspicious densities in either breast.  She has no axillary, supraclavicular, or cervical adenopathy. [de-identified] : Hypertrophic scar over Port-A-Cath excision site

## 2024-02-06 NOTE — REASON FOR VISIT
[Follow-Up: _____] : a [unfilled] follow-up visit [FreeTextEntry1] : The patient comes in with a strong family history of breast cancer and ovarian cancer and a personal history of a right breast triple negative breast cancer diagnosed in 2015 for which she underwent a right breast partial mastectomy and sentinel lymph node biopsy by Dr. Melia Waters at [facility].  She had a 3 cm triple negative breast cancer and had adjuvant chemotherapy and radiation therapy and genetic testing with a comprehensive BRCA panel in 2014 was negative.  She was found to have some findings on MRI in June 2020 in the left breast upper and lower outer quadrants and MRI biopsies showed benign papillary neoplasms and she comes in for routine follow-up.

## 2024-04-28 ENCOUNTER — TRANSCRIPTION ENCOUNTER (OUTPATIENT)
Age: 69
End: 2024-04-28

## 2024-04-30 DIAGNOSIS — R92.8 OTHER ABNORMAL AND INCONCLUSIVE FINDINGS ON DIAGNOSTIC IMAGING OF BREAST: ICD-10-CM

## 2024-05-09 ENCOUNTER — NON-APPOINTMENT (OUTPATIENT)
Age: 69
End: 2024-05-09

## 2024-05-20 ENCOUNTER — NON-APPOINTMENT (OUTPATIENT)
Age: 69
End: 2024-05-20